# Patient Record
Sex: MALE | Race: BLACK OR AFRICAN AMERICAN | NOT HISPANIC OR LATINO | Employment: FULL TIME | URBAN - METROPOLITAN AREA
[De-identification: names, ages, dates, MRNs, and addresses within clinical notes are randomized per-mention and may not be internally consistent; named-entity substitution may affect disease eponyms.]

---

## 2021-03-16 ENCOUNTER — HOSPITAL ENCOUNTER (EMERGENCY)
Facility: HOSPITAL | Age: 46
Discharge: HOME | End: 2021-03-16
Attending: EMERGENCY MEDICINE
Payer: MEDICAID

## 2021-03-16 VITALS
TEMPERATURE: 99 F | RESPIRATION RATE: 19 BRPM | OXYGEN SATURATION: 98 % | SYSTOLIC BLOOD PRESSURE: 147 MMHG | BODY MASS INDEX: 25.01 KG/M2 | HEIGHT: 68 IN | WEIGHT: 165 LBS | DIASTOLIC BLOOD PRESSURE: 88 MMHG | HEART RATE: 92 BPM

## 2021-03-16 DIAGNOSIS — H00.13 CHALAZION OF BOTH EYES: Primary | ICD-10-CM

## 2021-03-16 DIAGNOSIS — H00.16 CHALAZION OF BOTH EYES: Primary | ICD-10-CM

## 2021-03-16 PROCEDURE — 99284 EMERGENCY DEPT VISIT MOD MDM: CPT

## 2021-03-16 PROCEDURE — 25000003 PHARM REV CODE 250: Performed by: PHYSICIAN ASSISTANT

## 2021-03-16 RX ORDER — TRAMADOL HYDROCHLORIDE 50 MG/1
50 TABLET ORAL
Status: COMPLETED | OUTPATIENT
Start: 2021-03-16 | End: 2021-03-16

## 2021-03-16 RX ORDER — ERYTHROMYCIN 5 MG/G
OINTMENT OPHTHALMIC
Status: COMPLETED | OUTPATIENT
Start: 2021-03-16 | End: 2021-03-16

## 2021-03-16 RX ORDER — ERYTHROMYCIN 5 MG/G
OINTMENT OPHTHALMIC
Qty: 1 TUBE | Refills: 0 | OUTPATIENT
Start: 2021-03-16 | End: 2022-09-03

## 2021-03-16 RX ORDER — SULFAMETHOXAZOLE AND TRIMETHOPRIM 800; 160 MG/1; MG/1
1 TABLET ORAL 2 TIMES DAILY
Qty: 14 TABLET | Refills: 0 | Status: SHIPPED | OUTPATIENT
Start: 2021-03-16 | End: 2021-03-23

## 2021-03-16 RX ORDER — IBUPROFEN 600 MG/1
600 TABLET ORAL EVERY 6 HOURS PRN
Qty: 20 TABLET | Refills: 0 | Status: SHIPPED | OUTPATIENT
Start: 2021-03-16 | End: 2022-09-03

## 2021-03-16 RX ADMIN — ERYTHROMYCIN 1 INCH: 5 OINTMENT OPHTHALMIC at 06:03

## 2021-03-16 RX ADMIN — ERYTHROMYCIN 1 INCH: 5 OINTMENT OPHTHALMIC at 07:03

## 2021-03-16 RX ADMIN — TRAMADOL HYDROCHLORIDE 50 MG: 50 TABLET, FILM COATED ORAL at 06:03

## 2022-07-18 ENCOUNTER — HOSPITAL ENCOUNTER (EMERGENCY)
Facility: HOSPITAL | Age: 47
Discharge: HOME | End: 2022-07-18
Payer: MEDICAID

## 2022-07-18 VITALS
DIASTOLIC BLOOD PRESSURE: 72 MMHG | BODY MASS INDEX: 25.85 KG/M2 | SYSTOLIC BLOOD PRESSURE: 118 MMHG | HEART RATE: 70 BPM | TEMPERATURE: 99 F | OXYGEN SATURATION: 98 % | WEIGHT: 170 LBS | RESPIRATION RATE: 18 BRPM

## 2022-07-18 DIAGNOSIS — S09.90XA CLOSED HEAD INJURY, INITIAL ENCOUNTER: ICD-10-CM
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
## 2022-07-18 DIAGNOSIS — S16.1XXA STRAIN OF NECK MUSCLE, INITIAL ENCOUNTER: ICD-10-CM

## 2022-07-18 DIAGNOSIS — M54.50 ACUTE BILATERAL LOW BACK PAIN WITHOUT SCIATICA: ICD-10-CM

## 2022-07-18 PROCEDURE — 96361 HYDRATE IV INFUSION ADD-ON: CPT

## 2022-07-18 PROCEDURE — 96374 THER/PROPH/DIAG INJ IV PUSH: CPT

## 2022-07-18 PROCEDURE — 99285 EMERGENCY DEPT VISIT HI MDM: CPT | Mod: 25

## 2022-07-18 PROCEDURE — 63600175 PHARM REV CODE 636 W HCPCS

## 2022-07-18 PROCEDURE — 25000003 PHARM REV CODE 250

## 2022-07-18 RX ORDER — KETOROLAC TROMETHAMINE 30 MG/ML
15 INJECTION, SOLUTION INTRAMUSCULAR; INTRAVENOUS
Status: COMPLETED | OUTPATIENT
Start: 2022-07-18 | End: 2022-07-18

## 2022-07-18 RX ORDER — NAPROXEN 500 MG/1
500 TABLET ORAL 2 TIMES DAILY PRN
Qty: 20 TABLET | Refills: 0 | Status: SHIPPED | OUTPATIENT
Start: 2022-07-18 | End: 2022-09-03 | Stop reason: SDUPTHER

## 2022-07-18 RX ORDER — METHOCARBAMOL 750 MG/1
1500 TABLET, FILM COATED ORAL 3 TIMES DAILY PRN
Qty: 20 TABLET | Refills: 0 | OUTPATIENT
Start: 2022-07-18 | End: 2022-09-03

## 2022-07-18 RX ADMIN — KETOROLAC TROMETHAMINE 15 MG: 30 INJECTION, SOLUTION INTRAMUSCULAR; INTRAVENOUS at 03:07

## 2022-07-18 RX ADMIN — SODIUM CHLORIDE 1000 ML: 0.9 INJECTION, SOLUTION INTRAVENOUS at 03:07

## 2022-07-18 NOTE — ED PROVIDER NOTES
Encounter Date: 7/18/2022       History     Chief Complaint   Patient presents with    Fall     Slip and fell.  Pt was cleaning out a tank full of vegetable oil.  Complaining of neck and back pain. Denies any LOC.  Pt has c-collar in place . EMS gave pt 700 ml LR.     HPI   Patient presenting to ED for evaluation after slip and fall while at work this evening.  Patient says he was cleaning out a large tank that contained vegetable oil when he slipped and fell backwards hitting his back and head against the bottom of the metal tank.  Patient denies loss of consciousness.  Denies anticoagulant use.  Patient currently complaining of headache, neck pain, and lumbar spine pain.  Denies numbness or tingling.  Denies focal weakness.  He does note feeling some muscle cramping in his back earlier that has since improved after receiving IV fluids from EMS.  No other specific complaints or injuries at this time.      Review of patient's allergies indicates:  No Known Allergies  Past Medical History:   Diagnosis Date    Rhabdomyolysis 2012     No past surgical history on file.  Family History   Problem Relation Age of Onset    Rheum arthritis Mother     Cancer Mother     Migraines Mother     Diabetes Father      Social History     Tobacco Use    Smoking status: Former Smoker     Types: Cigarettes, Cigars    Smokeless tobacco: Never Used    Tobacco comment: occasional cigar smoker   Substance Use Topics    Alcohol use: Yes     Comment: rare; last drink 2 weeks ago    Drug use: No     Review of Systems   Constitutional: Negative for chills and fever.   HENT: Negative for congestion and rhinorrhea.    Eyes: Negative for photophobia, pain and visual disturbance.   Respiratory: Negative for cough and shortness of breath.    Cardiovascular: Negative for chest pain.   Gastrointestinal: Negative for abdominal pain, nausea and vomiting.   Genitourinary: Negative for dysuria.   Musculoskeletal: Positive for back pain and neck  pain.   Skin: Negative for wound.   Allergic/Immunologic: Negative for immunocompromised state.   Neurological: Positive for headaches.   Hematological: Does not bruise/bleed easily.   Psychiatric/Behavioral: Negative for confusion.       Physical Exam     Initial Vitals [07/18/22 0232]   BP Pulse Resp Temp SpO2   118/72 73 18 98.7 °F (37.1 °C) 98 %      MAP       --         Physical Exam    Constitutional: He appears well-developed. No distress.   HENT:   Head: Normocephalic.   Mouth/Throat: Oropharynx is clear and moist.   Eyes: EOM are normal. Pupils are equal, round, and reactive to light.   Neck:   C-collar left in place awaiting imaging   Cardiovascular: Normal rate, regular rhythm and normal heart sounds.   Pulmonary/Chest: Breath sounds normal. No respiratory distress. He exhibits no tenderness.   Abdominal: Abdomen is soft. Bowel sounds are normal. There is no abdominal tenderness.   Musculoskeletal:         General: No tenderness. Normal range of motion.     Neurological: He is alert and oriented to person, place, and time. He has normal strength. No cranial nerve deficit or sensory deficit. GCS score is 15. GCS eye subscore is 4. GCS verbal subscore is 5. GCS motor subscore is 6.   Skin: Skin is warm and dry.   Psychiatric: He has a normal mood and affect.         ED Course   Procedures  Labs Reviewed - No data to display       Imaging Results          CT Lumbar Spine Without Contrast (Final result)  Result time 07/18/22 05:10:30    Final result by Caden Guadalupe MD (07/18/22 05:10:30)                 Impression:      Chronic changes of the lumbar spine are noted, correlation for any specific level of symptomatology is needed.    On close evaluation of available imaging, there is no evidence for acute lumbar spine fracture deformity.      Electronically signed by: Caden Guadalupe  Date:    07/18/2022  Time:    05:10             Narrative:    EXAMINATION:  CT LUMBAR SPINE WITHOUT CONTRAST    CLINICAL  HISTORY:  Fall, low back pain/injury;    TECHNIQUE:  Low-dose axial, sagittal and coronal reformations are obtained through the lumbar spine.  Contrast was not administered.    COMPARISON:  None.    FINDINGS:  There is no evidence for high-grade spondylolisthesis, there is no evidence for high-grade or acute compression fracture deformity.  Facet arthropathy is noted, there is no evidence for facet dislocation or facet fracture deformity.    The T12-L1 level demonstrates no evidence for high-grade spinal canal or foraminal stenosis.  There appear to be lumbar ribs at L1.    The L1-2 level demonstrates no high-grade spinal canal or foraminal stenosis.    The L2-3 level demonstrates no high-grade spinal canal or foraminal stenosis.    The L3-4 level demonstrates mild degenerative disc bulge, minimal anterior flattening the dural sac, there is no evidence for high-grade spinal canal or foraminal stenosis.    The L4-5 level demonstrates mild degenerative disc bulge, mild anterior impression upon the dural sac.  Mild posterior ligamentous thickening.  Mild spinal canal narrowing without high-grade stenosis.  Mild foraminal encroachment without evidence for exiting nerve root impingement.    The L5-S1 level demonstrates diffuse degenerative disc bulge with anterior impression upon the dural sac, appearing to be in close proximity to or potentially abut the originating and descending S1 nerve roots, correlation for S1 nerve root symptomatology is needed.  There is encroachment into the neural foramen at the level of the disc plane, there is also facet arthropathy, there is bilateral foraminal stenosis.    At the L5-S1 level there is asymmetric facet arthropathy on the right, as well as widening of the facet joints, right greater than left, this may relate to facet joint effusions.  In addition on the right there appear to be areas of subarticular facet degenerative cystic change, appearing somewhat prominent.    There is  asymmetric chronic change at the right sacroiliac joint.    On close evaluation of available imaging, there is no evidence for acute cervical spine fracture deformity.                               CT Cervical Spine Without Contrast (Final result)  Result time 07/18/22 05:00:27    Final result by Caden Guadalupe MD (07/18/22 05:00:27)                 Impression:      Multilevel chronic change of the cervical spine, correlation for any specific level of symptomatology is needed.    On close evaluation of available imaging, there is no evidence for acute cervical spine fracture deformity.      Electronically signed by: Caden Guadalupe  Date:    07/18/2022  Time:    05:00             Narrative:    EXAMINATION:  CT CERVICAL SPINE WITHOUT CONTRAST    CLINICAL HISTORY:  Fall;    TECHNIQUE:  Low dose axial images, sagittal and coronal reformations were performed though the cervical spine.  Contrast was not administered.    COMPARISON:  None    FINDINGS:  There is straightening of the cervical spine.  There is multilevel chronic endplate change, loss of disc space height and marginal osteophyte formation.  There is no evidence for high-grade spondylolisthesis, there is no evidence for high-grade or acute compression fracture deformity.  There is no evidence for facet dislocation or facet fracture deformity.  The occipital condyles articulate appropriately with the superior articular facets of C1.    C2-3 level demonstrates mild chronic change, there is no evidence for high-grade spinal canal stenosis.    The C3-4 level demonstrates disc osteophyte disease with asymmetry to the left.  There is no evidence for high-grade spinal canal stenosis.  There is uncovertebral spurring, there is bilateral foraminal narrowing, left greater than right.    The C4-5 level demonstrates disc osteophyte disease, mild anterior impression upon the dural sac, there is asymmetry to the left.  There is uncovertebral spurring and there is left  foraminal narrowing.    The C5-6 level demonstrates disc osteophyte disease with anterior impression upon the dural sac, more prominent to the right of midline.  There is mild spinal canal stenosis.  There is uncovertebral spurring, there is right greater than left foraminal stenosis.    The C6-7 level demonstrates disc osteophyte disease with asymmetry to the left.  There is no high-grade spinal canal stenosis.  There is bilateral uncovertebral spurring, there is bilateral foraminal stenosis.    The C7-T1 level demonstrates mild disc disease, there is no high-grade spinal canal stenosis.  There is uncovertebral spurring with left foraminal stenosis, mild right foraminal narrowing.    On close evaluation of available imaging, there is no evidence for acute cervical spine fracture deformity.                               CT Head Without Contrast (Final result)  Result time 07/18/22 04:39:40    Final result by Caden Guadalupe MD (07/18/22 04:39:40)                 Impression:      There is no evidence for acute intracranial process.      Electronically signed by: Caden Guadalupe  Date:    07/18/2022  Time:    04:39             Narrative:    EXAMINATION:  CT HEAD WITHOUT CONTRAST    CLINICAL HISTORY:  Fall, head injury;    TECHNIQUE:  Low dose axial images were obtained through the head.  Coronal and sagittal reformations were also performed. Contrast was not administered.    COMPARISON:  None.    FINDINGS:  The ventricular system, sulcal pattern and parenchymal attenuation characteristics appear appropriate for age.  There is no evidence for intracranial mass, mass effect or midline shift, there is no evidence for acute intracranial hemorrhage.  Appropriate CSF spaces are seen at the skull base.    The visualized orbits appear intact.  The mastoid air cells appear well aerated.  Mild paranasal sinus mucosal thickening is noted.  There is a suspected mucous retention cyst, small to moderate involving the right  maxillary antrum.  The osseous structures appear intact.                                 Medications   sodium chloride 0.9% bolus 1,000 mL (0 mLs Intravenous Stopped 7/18/22 0430)   ketorolac injection 15 mg (15 mg Intravenous Given 7/18/22 0344)     MDM:  CT head, C-spine, and L-spine all negative for concerning acute abnormalities.  Patient was given IV fluids and Toradol with moderate improvement in symptoms on re-evaluation.  Does not appear to be indication for further emergent testing, observation, or hospitalization at this time.  Patient appears stable for and is comfortable with discharge home.  Advised to follow-up with PCP for outpatient recheck.  Signs and symptoms that would warrant immediate return to ED were reviewed prior to discharge.      Clinical Impression:   Final diagnoses:  [W19.XXXA] Accidental fall, initial encounter (Primary)  [S09.90XA] Closed head injury, initial encounter  [S16.1XXA] Strain of neck muscle, initial encounter  [M54.50] Acute bilateral low back pain without sciatica        ED Disposition Condition    Discharge Stable        ED Prescriptions     Medication Sig Dispense Start Date End Date Auth. Provider    naproxen (NAPROSYN) 500 MG tablet Take 1 tablet (500 mg total) by mouth 2 (two) times daily as needed (Pain). 20 tablet 7/18/2022  Marciano Soto MD    methocarbamoL (ROBAXIN) 750 MG Tab Take 2 tablets (1,500 mg total) by mouth 3 (three) times daily as needed (Muscle spasm). 20 tablet 7/18/2022  Marciano Soto MD        Follow-up Information     Follow up With Specialties Details Why Contact Info    Kelvin Diaz II, MD Internal Medicine Schedule an appointment as soon as possible for a visit  Follow-up with your primary care physician for outpatient recheck.  Since this injury occurred at work, you may also need to follow-up with Employee Health, discuss this with your supervisor. [address]  [facility] [address]  [phone]      [facility] - Emergency Dept  Emergency Medicine  Return to the ED sooner for any new or worsening symptoms or for any other concerns. [address] [facility] [address] [phone]           Marciano Soto MD  07/18/22 0619

## 2022-08-10 ENCOUNTER — TELEPHONE (OUTPATIENT)
Dept: FAMILY MEDICINE | Facility: CLINIC | Age: 47
End: 2022-08-10
Payer: MEDICAID

## 2022-08-10 NOTE — TELEPHONE ENCOUNTER
[employer] office we could not see patient due to insurance type, regardless of what form of payment was being presented for visit.

## 2022-08-10 NOTE — TELEPHONE ENCOUNTER
----- Message from Lili Brar LPN sent at 8/10/2022  2:11 PM CDT -----    ----- Message -----  From: Deion Christianson MA  Sent: 8/10/2022   2:05 PM CDT  To: [facility] Buchanan Staff    Type: Patient Call Back    Who called: [employer]    What is the request in detail:calling to see if they can get this pt. Scheduled.. he has a law suit case currently ..     Can the clinic reply by [facility]?no    Would the patient rather a call back or a response via [facility]? yes    Best call back number: [phone]

## 2022-09-03 ENCOUNTER — HOSPITAL ENCOUNTER (EMERGENCY)
Facility: HOSPITAL | Age: 47
Discharge: HOME | End: 2022-09-03
Attending: EMERGENCY MEDICINE
Payer: MEDICAID

## 2022-09-03 VITALS
DIASTOLIC BLOOD PRESSURE: 70 MMHG | BODY MASS INDEX: 26.07 KG/M2 | TEMPERATURE: 98 F | WEIGHT: 172 LBS | HEIGHT: 68 IN | RESPIRATION RATE: 16 BRPM | SYSTOLIC BLOOD PRESSURE: 109 MMHG | OXYGEN SATURATION: 100 % | HEART RATE: 88 BPM

## 2022-09-03 DIAGNOSIS — S86.911A KNEE STRAIN, RIGHT, INITIAL ENCOUNTER: ICD-10-CM

## 2022-09-03 DIAGNOSIS — M25.569 KNEE PAIN: ICD-10-CM

## 2022-09-03 DIAGNOSIS — H10.9 CONJUNCTIVITIS OF BOTH EYES, UNSPECIFIED CONJUNCTIVITIS TYPE: Primary | ICD-10-CM

## 2022-09-03 PROCEDURE — 63600175 PHARM REV CODE 636 W HCPCS: Performed by: PHYSICIAN ASSISTANT

## 2022-09-03 PROCEDURE — 99284 EMERGENCY DEPT VISIT MOD MDM: CPT | Mod: 25

## 2022-09-03 PROCEDURE — 96372 THER/PROPH/DIAG INJ SC/IM: CPT | Performed by: PHYSICIAN ASSISTANT

## 2022-09-03 PROCEDURE — 25000003 PHARM REV CODE 250: Performed by: PHYSICIAN ASSISTANT

## 2022-09-03 RX ORDER — KETOROLAC TROMETHAMINE 30 MG/ML
30 INJECTION, SOLUTION INTRAMUSCULAR; INTRAVENOUS
Status: COMPLETED | OUTPATIENT
Start: 2022-09-03 | End: 2022-09-03

## 2022-09-03 RX ORDER — ERYTHROMYCIN 5 MG/G
OINTMENT OPHTHALMIC EVERY 4 HOURS
Qty: 1 G | Refills: 0 | Status: SHIPPED | OUTPATIENT
Start: 2022-09-03 | End: 2022-09-10

## 2022-09-03 RX ORDER — TETRACAINE HYDROCHLORIDE 5 MG/ML
2 SOLUTION OPHTHALMIC
Status: COMPLETED | OUTPATIENT
Start: 2022-09-03 | End: 2022-09-03

## 2022-09-03 RX ORDER — NAPROXEN 500 MG/1
500 TABLET ORAL 2 TIMES DAILY PRN
Qty: 20 TABLET | Refills: 0 | Status: SHIPPED | OUTPATIENT
Start: 2022-09-03

## 2022-09-03 RX ORDER — CETIRIZINE HYDROCHLORIDE 10 MG/1
10 TABLET ORAL DAILY
Qty: 30 TABLET | Refills: 0 | Status: SHIPPED | OUTPATIENT
Start: 2022-09-03 | End: 2022-10-03

## 2022-09-03 RX ORDER — ERYTHROMYCIN 5 MG/G
OINTMENT OPHTHALMIC
Status: COMPLETED | OUTPATIENT
Start: 2022-09-03 | End: 2022-09-03

## 2022-09-03 RX ADMIN — ERYTHROMYCIN 1 INCH: 5 OINTMENT OPHTHALMIC at 04:09

## 2022-09-03 RX ADMIN — KETOROLAC TROMETHAMINE 30 MG: 30 INJECTION, SOLUTION INTRAMUSCULAR; INTRAVENOUS at 04:09

## 2022-09-03 RX ADMIN — FLUORESCEIN SODIUM 1 EACH: 1 STRIP OPHTHALMIC at 03:09

## 2022-09-03 RX ADMIN — TETRACAINE HYDROCHLORIDE 2 DROP: 5 SOLUTION OPHTHALMIC at 03:09

## 2022-09-03 NOTE — DISCHARGE INSTRUCTIONS
Erythromycin as directed.  Zyrtec as directed.  Naprosyn for your knee.  Please follow-up at the occupational medicine clinic.  Please return immediately if you get worse or if new problems develop.  You may use a heating pad on your knee.  Be sure to take your erythromycin every 6 hours for both eyes.  You must follow-up with the optometrist this week, without fail..

## 2022-09-03 NOTE — Clinical Note
"Yon"Petey Juan was seen and treated in our emergency department on 9/3/2022.  He may return to work on 09/06/2022.       If you have any questions or concerns, please don't hesitate to call.      Mickey Leyva RN RN    "

## 2022-09-03 NOTE — ED PROVIDER NOTES
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  Now complaining of eyes burning and states that he can smell and taste that chemical, also complaining of right knee pain and swelling.     Chief Complaint: Eye pain/Knee pain  History of  Present Illness: History obtained from patient. This 46 y.o. male who has no known past medical history presents to the ED complaining of right knee pain and bilateral eye pain that describes as a burning sensation since a work related injury 6 weeks ago.  Patient states that he fell into a vegetable oil tank while cleaning it 6 weeks ago.  Patient complains of persisting neck pain, back pain, bilateral eye pain and right knee pain since the fall.  Patient was evaluated [facility] for his injuries and head CT of the head, neck and back performed which showed no acute findings.  Patient states that he did not receive x-rays of his knee.  Patient has not returned to work since the injury.  Patient states that his eyes have been crusted closed in the mornings.  Denies vision changes, numbness, tingling, weakness.  Patient does not were contact lenses or glasses.    Review of patient's allergies indicates:  No Known Allergies  Past Medical History:   Diagnosis Date    Rhabdomyolysis 2012     History reviewed. No pertinent surgical history.  Family History   Problem Relation Age of Onset    Rheum arthritis Mother     Cancer Mother     Migraines Mother     Diabetes Father      Social History     Tobacco Use    Smoking status: Former     Types: Cigarettes, Cigars    Smokeless tobacco: Never    Tobacco comments:     occasional cigar smoker   Substance Use Topics    Alcohol use: Yes     Comment: rare; last drink 2 weeks ago    Drug use: No     Review of Systems   Constitutional:  Negative for chills and fever.   HENT:  Negative for congestion,  rhinorrhea and sore throat.    Eyes:  Positive for pain, discharge and redness. Negative for photophobia, itching and visual disturbance.   Respiratory:  Negative for cough and shortness of breath.    Cardiovascular:  Negative for chest pain.   Gastrointestinal:  Negative for abdominal pain, diarrhea, nausea and vomiting.   Genitourinary:  Negative for dysuria, frequency and hematuria.   Musculoskeletal:  Positive for arthralgias. Negative for back pain.   Skin:  Negative for rash.   Neurological:  Negative for dizziness, weakness and headaches.     Physical Exam     Initial Vitals [09/03/22 0322]   BP Pulse Resp Temp SpO2   134/77 87 17 98.1 °F (36.7 °C) 97 %      MAP       --         Physical Exam    Nursing note and vitals reviewed.  Constitutional: He appears well-developed and well-nourished. No distress.   HENT:   Head: Normocephalic and atraumatic.   Right Ear: Tympanic membrane normal.   Left Ear: Tympanic membrane normal.   Nose: Nose normal.   Mouth/Throat: Uvula is midline, oropharynx is clear and moist and mucous membranes are normal.   Eyes: EOM and lids are normal. Pupils are equal, round, and reactive to light. Lids are everted and swept, no foreign bodies found. Right conjunctiva is not injected. Left conjunctiva is injected.   Neck: Trachea normal and phonation normal. Neck supple. No stridor present.   Normal range of motion.   Full passive range of motion without pain.     Cardiovascular:  Normal rate, regular rhythm and normal heart sounds.     Exam reveals no gallop and no friction rub.       No murmur heard.  Pulmonary/Chest: Effort normal and breath sounds normal. No respiratory distress. He has no wheezes. He has no rhonchi. He has no rales.   Abdominal: Abdomen is soft. Bowel sounds are normal. He exhibits no mass. There is no abdominal tenderness. There is no rebound and no guarding.   Musculoskeletal:         General: Normal range of motion.      Cervical back: Full passive range of motion  without pain, normal range of motion and neck supple. No rigidity. No spinous process tenderness or muscular tenderness. Normal range of motion.      Right knee: Normal. No swelling, deformity, effusion, erythema, ecchymosis, lacerations, bony tenderness or crepitus. Normal range of motion. No tenderness. No LCL laxity, MCL laxity, ACL laxity or PCL laxity. Normal alignment, normal meniscus and normal patellar mobility.     Neurological: He is alert and oriented to person, place, and time. He has normal strength. No cranial nerve deficit or sensory deficit.   Skin: Skin is warm and dry. Capillary refill takes less than 2 seconds.   Psychiatric: He has a normal mood and affect.       ED Course   Procedures  Labs Reviewed - No data to display       Imaging Results              X-Ray Knee 3 View Right (Final result)  Result time 09/03/22 07:02:22      Final result by Ravindra Mijares MD (09/03/22 07:02:22)                   Impression:      No acute displaced fracture.      Electronically signed by: Ravindra Mijares MD  Date:    09/03/2022  Time:    07:02               Narrative:    EXAMINATION:  XR KNEE 3 VIEW RIGHT    CLINICAL HISTORY:  Pain in unspecified knee.    TECHNIQUE:  AP, lateral, and Merchant views of the right knee were performed.    COMPARISON:  None.    FINDINGS:  No acute fracture or dislocation, allowing for suboptimal positioning.  There are mild tricompartmental degenerative changes.  No unexpected radiopaque foreign body.  Questionable small joint effusion and soft tissue edema.                                       Medications   fluorescein ophthalmic strip 1 each (1 each Both Eyes Given 9/3/22 0330)   TETRAcaine HCl (PF) 0.5 % Drop 2 drop (2 drops Both Eyes Given 9/3/22 0330)   erythromycin 5 mg/gram (0.5 %) ophthalmic ointment (1 inch Both Eyes Given 9/3/22 0439)   ketorolac injection 30 mg (30 mg Intramuscular Given 9/3/22 0439)     Medical Decision Making:   Initial Assessment:   This is an  evaluation of a 46-year-old male presents emergency department complaining of bilateral eye pain and right knee pain after fall at work 6 weeks ago.  Initial exam, patient is nontoxic appearing and in no acute distress.  Vital signs are stable.  Afebrile.  Examination of the eyes, extraocular movements are intact without pain.  PERRLA.  Bilateral conjunctivae are injected.  Examination of the right knee is unremarkable.    Patient's chart was reviewed.  Patient was evaluated [facility] after initial injury and had CT of the head, cervical spine and lumbar spine performed which showed no acute process.  Patient did not have imaging performed of the knee.  Will obtain x-ray of the knee.    ED Management:  The eyes were examined under fluorescein.  No evidence of corneal abrasion.  Negative Shahid sign.  No dendritic lesions.  No corneal ulcerations.    Given history of chemical exposure, I suspect nonspecific conjunctivitis at this time.  Will apply erythromycin.    X-ray of the knee pending at this time.    I discussed the case with Dr. Sinclair who will resume care for the patient at the end of my shift.    Medical decision making:  This is doctor Sinclair dictating.  I re-examined this patient at 7:59 a.m..  The patient has injected conjunctiva bilaterally.  The corneas are clear the anterior chambers are clear.  The patient does have a thick exit 8 in the eyes bilaterally and symmetrically.  The patient has a very small effusion in his right knee.  X-rays are negative for fracture.  I will discharge to follow-up with primary care and Optometry.                      Clinical Impression:   Final diagnoses:  [M25.569] Knee pain  [H10.9] Conjunctivitis of both eyes, unspecified conjunctivitis type (Primary)  [S86.911A] Knee strain, right, initial encounter        ED Disposition Condition    Discharge Stable          ED Prescriptions       Medication Sig Dispense Start Date End Date Auth. Provider    erythromycin  (ROMYCIN) ophthalmic ointment Place into both eyes every 4 (four) hours. Place a 1/2 inch ribbon of ointment into the lower eyelid. for 7 days 1 g 9/3/2022 9/10/2022 Sunday Harper PA-C    cetirizine (ZYRTEC) 10 MG tablet Take 1 tablet (10 mg total) by mouth once daily. 30 tablet 9/3/2022 10/3/2022 Sunday Harper PA-C    naproxen (NAPROSYN) 500 MG tablet Take 1 tablet (500 mg total) by mouth 2 (two) times daily as needed (Pain). 20 tablet 9/3/2022 -- Gorge Sinclair MD          Follow-up Information       Follow up With Specialties Details Why Contact Info    Kelvin Diaz II, MD Internal Medicine In 1 week  [address]  [place] [address]  [phone]      [facility] Emergency Dept Emergency Medicine Go in 1 day If symptoms worsen [address] [facility] [address] [phone]    Viri Vo, OD Optometry In 2 days Follow-up on Monday. [address] [facility] [address]  [phone]               Gorge Sinclair MD  09/03/22 0805

## 2022-12-06 ENCOUNTER — NURSE TRIAGE (OUTPATIENT)
Dept: ADMINISTRATIVE | Facility: CLINIC | Age: 47
End: 2022-12-06
Payer: MEDICAID

## 2022-12-07 NOTE — TELEPHONE ENCOUNTER
Numbness to right lower leg from knee down since 9am this morning.  Pt states difficulty with ambulation due to numbness in his lower leg, states feels as if the numbness is further up his leg than before.  Care advice states to call 911.  Pt verbally understands, all questions answered.  Advised were here 24/7 for any further needs.    Reason for Disposition   [1] SEVERE weakness (i.e., unable to walk or barely able to walk, requires support) AND [2] new-onset or worsening    Protocols used: Neurologic Deficit-A-AH

## 2022-12-08 NOTE — TELEPHONE ENCOUNTER
Called to f/u w/ pt, no answer on mobile or home phone. Was able to leave a voice message, informed pt to give us a call back

## 2022-12-12 NOTE — TELEPHONE ENCOUNTER
Called pt's home phone no answer and not able to leave voice message. No answer on mobile phone, left voice message to give us a call back

## 2023-03-04 ENCOUNTER — HOSPITAL ENCOUNTER (EMERGENCY)
Facility: HOSPITAL | Age: 48
Discharge: LEFT BEFORE DISCHARGE | End: 2023-03-05
Payer: MEDICAID

## 2023-03-04 VITALS
DIASTOLIC BLOOD PRESSURE: 93 MMHG | WEIGHT: 170 LBS | TEMPERATURE: 99 F | HEART RATE: 90 BPM | BODY MASS INDEX: 25.85 KG/M2 | SYSTOLIC BLOOD PRESSURE: 144 MMHG | OXYGEN SATURATION: 98 % | RESPIRATION RATE: 18 BRPM

## 2023-03-04 DIAGNOSIS — R55 SYNCOPE: ICD-10-CM

## 2023-03-04 DIAGNOSIS — R46.89 ALTERED BEHAVIOR: ICD-10-CM

## 2023-03-04 PROCEDURE — 93010 ELECTROCARDIOGRAM REPORT: CPT | Mod: ,,, | Performed by: INTERNAL MEDICINE

## 2023-03-04 PROCEDURE — 93005 ELECTROCARDIOGRAM TRACING: CPT

## 2023-03-04 PROCEDURE — 99283 EMERGENCY DEPT VISIT LOW MDM: CPT | Mod: 25

## 2023-03-04 PROCEDURE — 93010 EKG 12-LEAD: ICD-10-PCS | Mod: ,,, | Performed by: INTERNAL MEDICINE

## 2023-10-23 ENCOUNTER — HOSPITAL ENCOUNTER (EMERGENCY)
Facility: HOSPITAL | Age: 48
Discharge: HOME | End: 2023-10-23
Attending: EMERGENCY MEDICINE
Payer: MEDICAID

## 2023-10-23 VITALS
SYSTOLIC BLOOD PRESSURE: 125 MMHG | HEART RATE: 79 BPM | OXYGEN SATURATION: 98 % | TEMPERATURE: 98 F | WEIGHT: 165 LBS | RESPIRATION RATE: 16 BRPM | DIASTOLIC BLOOD PRESSURE: 74 MMHG | BODY MASS INDEX: 25.01 KG/M2 | HEIGHT: 68 IN

## 2023-10-23 DIAGNOSIS — S61.511A LACERATION OF RIGHT WRIST, INITIAL ENCOUNTER: Primary | ICD-10-CM

## 2023-10-23 PROCEDURE — 90471 IMMUNIZATION ADMIN: CPT | Performed by: PHYSICIAN ASSISTANT

## 2023-10-23 PROCEDURE — 63600175 PHARM REV CODE 636 W HCPCS: Performed by: PHYSICIAN ASSISTANT

## 2023-10-23 PROCEDURE — 25000003 PHARM REV CODE 250: Performed by: PHYSICIAN ASSISTANT

## 2023-10-23 PROCEDURE — 90715 TDAP VACCINE 7 YRS/> IM: CPT | Performed by: PHYSICIAN ASSISTANT

## 2023-10-23 PROCEDURE — 12001 RPR S/N/AX/GEN/TRNK 2.5CM/<: CPT

## 2023-10-23 PROCEDURE — 99284 EMERGENCY DEPT VISIT MOD MDM: CPT

## 2023-10-23 RX ORDER — LIDOCAINE HYDROCHLORIDE 10 MG/ML
10 INJECTION INFILTRATION; PERINEURAL
Status: COMPLETED | OUTPATIENT
Start: 2023-10-23 | End: 2023-10-23

## 2023-10-23 RX ADMIN — LIDOCAINE HYDROCHLORIDE 10 ML: 10 INJECTION, SOLUTION INFILTRATION; PERINEURAL at 05:10

## 2023-10-23 RX ADMIN — TETANUS TOXOID, REDUCED DIPHTHERIA TOXOID AND ACELLULAR PERTUSSIS VACCINE, ADSORBED 0.5 ML: 5; 2.5; 8; 8; 2.5 SUSPENSION INTRAMUSCULAR at 05:10

## 2023-10-23 NOTE — ED PROVIDER NOTES
"Encounter Date: 10/23/2023       History     Chief Complaint   Patient presents with    Laceration     Pt presents to the ED with c/o laceration to right wrist. States he was working in his yard and moving things around his garage and is unsure what cut him. Believes it may have been "a sharp tool." Bleeding controlled in triage. Pressure dressing applied. Thinks it may have happened around 5-6PM last night. Last tetanus >5 years. Pt able to move all digits freely. Cap refil <3 sec     48-year-old male presents with a laceration to the wrist.  Laceration sustained yesterday evening approximately 10 hours ago.  Laceration from a machete.  Unsure of the last time had a tetanus vaccination.  Bleeding controlled.      Review of patient's allergies indicates:  No Known Allergies  Past Medical History:   Diagnosis Date    Rhabdomyolysis 2012     No past surgical history on file.  Family History   Problem Relation Age of Onset    Rheum arthritis Mother     Cancer Mother     Migraines Mother     Diabetes Father      Social History     Tobacco Use    Smoking status: Former     Types: Cigarettes, Cigars    Smokeless tobacco: Never    Tobacco comments:     occasional cigar smoker   Substance Use Topics    Alcohol use: Yes     Comment: rare; last drink 2 weeks ago    Drug use: No     Review of Systems   Constitutional:  Negative for fever.   HENT:  Negative for sore throat.    Respiratory:  Negative for shortness of breath.    Cardiovascular:  Negative for chest pain.   Gastrointestinal:  Negative for nausea.   Genitourinary:  Negative for dysuria.   Musculoskeletal:  Negative for back pain.   Skin:  Positive for wound. Negative for rash.   Neurological:  Negative for weakness.   Hematological:  Does not bruise/bleed easily.       Physical Exam     Initial Vitals [10/23/23 0441]   BP Pulse Resp Temp SpO2   125/74 79 16 98.2 °F (36.8 °C) 98 %      MAP       --         Physical Exam    Constitutional: Vital signs are normal. He " appears well-developed and well-nourished.   HENT:   Head: Normocephalic and atraumatic.   Right Ear: Hearing normal.   Left Ear: Hearing normal.   Eyes: Conjunctivae are normal.   Cardiovascular:  Normal rate and regular rhythm.           Abdominal: Abdomen is soft. Bowel sounds are normal.   Musculoskeletal:         General: Normal range of motion.     Neurological: He is alert and oriented to person, place, and time.   Skin: Skin is warm and intact.   Small 2 cm laceration to the right wrist.  Palpable and strong radial pulse.  Normal capillary refill in the hand.  Normal sensation.  No foreign bodies identified.  Superficial laceration.  Not deep.   Psychiatric: He has a normal mood and affect. His speech is normal and behavior is normal. Cognition and memory are normal.         ED Course   Lac Repair    Date/Time: 10/23/2023 5:12 AM    Performed by: Anatoliy Cha PA-C  Authorized by: Peyton Oliveira MD    Consent:     Consent obtained:  Verbal    Consent given by:  Patient  Universal protocol:     Patient identity confirmed:  Verbally with patient  Laceration details:     Location:  Hand    Hand location:  R wrist    Length (cm):  2  Pre-procedure details:     Preparation:  Patient was prepped and draped in usual sterile fashion  Exploration:     Limited defect created (wound extended): no      Contaminated: no    Treatment:     Area cleansed with:  Chlorhexidine and saline    Amount of cleaning:  Standard    Irrigation solution:  Sterile saline    Irrigation volume:  500  Skin repair:     Repair method:  Sutures    Suture size:  3-0    Suture material:  Prolene    Suture technique:  Simple interrupted    Number of sutures:  4  Approximation:     Approximation:  Close  Repair type:     Repair type:  Simple  Post-procedure details:     Dressing:  Open (no dressing)    Procedure completion:  Tolerated well, no immediate complications    Labs Reviewed - No data to display       Imaging Results    None           Medications   LIDOcaine HCL 10 mg/ml (1%) injection 10 mL (10 mLs Infiltration Given 10/23/23 4805)   Tdap (BOOSTRIX) vaccine injection 0.5 mL (0.5 mLs Intramuscular Given 10/23/23 5502)     Medical Decision Making  Male with a laceration to the right wrist.  Patient tolerated procedure well with no immediate complications.  He was discharged home in stable condition.  Wound care precautions and return precautions given.    Risk  Prescription drug management.                               Clinical Impression:   Final diagnoses:  [S61.511A] Laceration of right wrist, initial encounter (Primary)        ED Disposition Condition    Discharge Stable          ED Prescriptions    None       Follow-up Information       Follow up With Specialties Details Why Contact Info    Kelvin Diaz II, MD Internal Medicine   [address] [place] [id number]  [phone]               Anatoliy Cha, PA-WILIAN  10/23/23 8999

## 2023-10-23 NOTE — DISCHARGE INSTRUCTIONS
Keep the wound clean with soap and water daily  Sutures should come out in 7-10 days   Return to the ED as needed      Thank you for coming to our Emergency Department today. It is important to remember that some problems are difficult to diagnose and may not be found during your Emergency Department visit. Be sure to follow up with your primary care doctor and review all labs/imaging/tests that were performed during this visit with them. Some labs/tests may be outside of the normal range and require non-emergent follow-up and further investigation to help diagnose/exclude/prevent complications or other medical conditions.    If you do not have a primary care doctor, you may contact the one listed on your discharge paperwork or you may also call the [facility] Appointment Desk at [phone] to schedule an appointment and establish care with one. It is important to your health that you have a primary care doctor.    Please take all medications as directed. All medications may potentially have side-effects and it is impossible to predict which medications may give you side-effects or what side-effects (if any) they will give you.. If you feel that you are having a negative effect or side-effect of any medication you should immediately stop taking them and seek medical attention. If you feel that you are having a life-threatening reaction call 911.    Return to the ER with any questions/concerns, new/concerning symptoms, worsening or failure to improve.     Do not drive, swim, climb to height, take a bath or make any important decisions for 24 hours if you have received any pain medications, sedatives or mood altering drugs during your ER visit.

## 2023-11-13 ENCOUNTER — HOSPITAL ENCOUNTER (EMERGENCY)
Facility: HOSPITAL | Age: 48
Discharge: HOME | End: 2023-11-13
Attending: STUDENT IN AN ORGANIZED HEALTH CARE EDUCATION/TRAINING PROGRAM
Payer: MEDICAID

## 2023-11-13 VITALS
OXYGEN SATURATION: 100 % | HEART RATE: 92 BPM | RESPIRATION RATE: 16 BRPM | DIASTOLIC BLOOD PRESSURE: 63 MMHG | BODY MASS INDEX: 25.76 KG/M2 | TEMPERATURE: 98 F | WEIGHT: 170 LBS | HEIGHT: 68 IN | SYSTOLIC BLOOD PRESSURE: 129 MMHG

## 2023-11-13 DIAGNOSIS — Z48.02 VISIT FOR SUTURE REMOVAL: Primary | ICD-10-CM

## 2023-11-13 PROCEDURE — 99281 EMR DPT VST MAYX REQ PHY/QHP: CPT

## 2023-11-13 NOTE — ED PROVIDER NOTES
Encounter Date: 11/13/2023       History     Chief Complaint   Patient presents with    Suture / Staple Removal     Arrives to ER with stitches in right wrist, reports placed a few weeks ago after injury in tool shed, here for removal. No problems with wound.      48-year-old male smoker presents to ED with need for suture removal.    Had sutures placed to his right wrist on 10/23, presents to ED for removal.  Denies any wound dehiscence, worsening pain, drainage, or any complications to the area.      Review of patient's allergies indicates:  No Known Allergies  Past Medical History:   Diagnosis Date    Rhabdomyolysis 2012     No past surgical history on file.  Family History   Problem Relation Age of Onset    Rheum arthritis Mother     Cancer Mother     Migraines Mother     Diabetes Father      Social History     Tobacco Use    Smoking status: Former     Types: Cigarettes, Cigars    Smokeless tobacco: Never    Tobacco comments:     occasional cigar smoker   Substance Use Topics    Alcohol use: Yes     Comment: rare; last drink 2 weeks ago    Drug use: No     Review of Systems   Constitutional:  Negative for fever.   Musculoskeletal:  Negative for joint swelling.   Skin:  Positive for wound.   Neurological:  Negative for syncope.   Hematological:  Negative for adenopathy.       Physical Exam     Initial Vitals [11/13/23 0434]   BP Pulse Resp Temp SpO2   129/63 92 16 98 °F (36.7 °C) 100 %      MAP       --         Physical Exam    Nursing note and vitals reviewed.  Constitutional: He appears well-developed and well-nourished. He is not diaphoretic. No distress.   HENT:   Head: Normocephalic and atraumatic.   Neck: Neck supple.   Normal range of motion.  Musculoskeletal:      Cervical back: Normal range of motion and neck supple.      Comments: Right wrist:  Mostly healed wound, 4 Prolene sutures in place, no dehiscence, CDI, no erythema or warmth     Neurological: He is alert and oriented to person, place, and time.    Skin: Skin is warm.         ED Course   Suture Removal    Date/Time: 11/13/2023 4:42 AM  Location procedure was performed: [facility] EMERGENCY DEPARTMENT    Performed by: David Chambers PA-C  Authorized by: Dk Preciado MD  Body area: upper extremity  Location details: right wrist  Wound Appearance: clean, well healed, normal color, nontender and no drainage  Sutures Removed: 4  Post-removal: bandaid applied  Facility: sutures placed in this facility  Complications: No  Specimens: No  Implants: No  Patient tolerance: Patient tolerated the procedure well with no immediate complications        Labs Reviewed - No data to display       Imaging Results    None          Medications - No data to display  Medical Decision Making  Differential diagnosis:  Wound infection, cellulitis, dehiscence    Amount and/or Complexity of Data Reviewed  Discussion of management or test interpretation with external provider(s): Wound CDI.  Mostly healed.  Sutures removed.  Discussed local wound care.                               Clinical Impression:   Final diagnoses:  [Z48.02] Visit for suture removal (Primary)        ED Disposition Condition    Discharge Stable          ED Prescriptions    None       Follow-up Information    None          David Chambers PA-C  11/13/23 0442